# Patient Record
Sex: FEMALE | Race: OTHER | HISPANIC OR LATINO
[De-identification: names, ages, dates, MRNs, and addresses within clinical notes are randomized per-mention and may not be internally consistent; named-entity substitution may affect disease eponyms.]

---

## 2017-02-14 ENCOUNTER — TRANSCRIPTION ENCOUNTER (OUTPATIENT)
Age: 82
End: 2017-02-14

## 2017-02-14 ENCOUNTER — OUTPATIENT (OUTPATIENT)
Dept: OUTPATIENT SERVICES | Facility: HOSPITAL | Age: 82
LOS: 1 days | End: 2017-02-14
Payer: MEDICARE

## 2017-02-14 DIAGNOSIS — K59.00 CONSTIPATION, UNSPECIFIED: ICD-10-CM

## 2017-02-14 DIAGNOSIS — Z12.11 ENCOUNTER FOR SCREENING FOR MALIGNANT NEOPLASM OF COLON: ICD-10-CM

## 2017-02-14 PROCEDURE — T1013: CPT

## 2017-02-14 PROCEDURE — G0121: CPT

## 2018-12-14 ENCOUNTER — OUTPATIENT (OUTPATIENT)
Dept: OUTPATIENT SERVICES | Facility: HOSPITAL | Age: 83
LOS: 1 days | End: 2018-12-14
Payer: COMMERCIAL

## 2018-12-14 DIAGNOSIS — R27.9 UNSPECIFIED LACK OF COORDINATION: ICD-10-CM

## 2018-12-14 DIAGNOSIS — Z51.89 ENCOUNTER FOR OTHER SPECIFIED AFTERCARE: ICD-10-CM

## 2018-12-14 DIAGNOSIS — G20 PARKINSON'S DISEASE: ICD-10-CM

## 2019-01-31 PROCEDURE — 97112 NEUROMUSCULAR REEDUCATION: CPT

## 2019-01-31 PROCEDURE — G8979: CPT | Mod: CK

## 2019-01-31 PROCEDURE — 97163 PT EVAL HIGH COMPLEX 45 MIN: CPT

## 2019-01-31 PROCEDURE — G8978: CPT | Mod: CN

## 2019-01-31 PROCEDURE — 97530 THERAPEUTIC ACTIVITIES: CPT

## 2019-01-31 PROCEDURE — 97116 GAIT TRAINING THERAPY: CPT

## 2019-01-31 PROCEDURE — 97110 THERAPEUTIC EXERCISES: CPT

## 2020-07-26 ENCOUNTER — INPATIENT (INPATIENT)
Facility: HOSPITAL | Age: 85
LOS: 1 days | Discharge: HOME HEALTH | DRG: 308 | End: 2020-07-28
Attending: HOSPITALIST | Admitting: HOSPITALIST
Payer: COMMERCIAL

## 2020-07-26 VITALS
OXYGEN SATURATION: 98 % | RESPIRATION RATE: 20 BRPM | DIASTOLIC BLOOD PRESSURE: 76 MMHG | SYSTOLIC BLOOD PRESSURE: 127 MMHG | TEMPERATURE: 99 F | HEART RATE: 113 BPM

## 2020-07-26 DIAGNOSIS — I48.91 UNSPECIFIED ATRIAL FIBRILLATION: ICD-10-CM

## 2020-07-26 LAB
ALBUMIN SERPL ELPH-MCNC: 4.3 G/DL — SIGNIFICANT CHANGE UP (ref 3.3–5.2)
ALP SERPL-CCNC: 70 U/L — SIGNIFICANT CHANGE UP (ref 40–120)
ALT FLD-CCNC: 7 U/L — SIGNIFICANT CHANGE UP
ANION GAP SERPL CALC-SCNC: 14 MMOL/L — SIGNIFICANT CHANGE UP (ref 5–17)
APPEARANCE UR: CLEAR — SIGNIFICANT CHANGE UP
APPEARANCE UR: CLEAR — SIGNIFICANT CHANGE UP
APTT BLD: 28.6 SEC — SIGNIFICANT CHANGE UP (ref 27.5–35.5)
AST SERPL-CCNC: 28 U/L — SIGNIFICANT CHANGE UP
BACTERIA # UR AUTO: SIGNIFICANT CHANGE UP
BILIRUB SERPL-MCNC: 1.2 MG/DL — SIGNIFICANT CHANGE UP (ref 0.4–2)
BILIRUB UR-MCNC: NEGATIVE — SIGNIFICANT CHANGE UP
BILIRUB UR-MCNC: NEGATIVE — SIGNIFICANT CHANGE UP
BUN SERPL-MCNC: 19 MG/DL — SIGNIFICANT CHANGE UP (ref 8–20)
CALCIUM SERPL-MCNC: 10.2 MG/DL — SIGNIFICANT CHANGE UP (ref 8.6–10.2)
CHLORIDE SERPL-SCNC: 102 MMOL/L — SIGNIFICANT CHANGE UP (ref 98–107)
CO2 SERPL-SCNC: 23 MMOL/L — SIGNIFICANT CHANGE UP (ref 22–29)
COLOR SPEC: YELLOW — SIGNIFICANT CHANGE UP
COLOR SPEC: YELLOW — SIGNIFICANT CHANGE UP
CREAT SERPL-MCNC: 1.33 MG/DL — HIGH (ref 0.5–1.3)
DIFF PNL FLD: ABNORMAL
DIFF PNL FLD: NEGATIVE — SIGNIFICANT CHANGE UP
EPI CELLS # UR: SIGNIFICANT CHANGE UP
EPI CELLS # UR: SIGNIFICANT CHANGE UP
GLUCOSE SERPL-MCNC: 91 MG/DL — SIGNIFICANT CHANGE UP (ref 70–99)
GLUCOSE UR QL: NEGATIVE MG/DL — SIGNIFICANT CHANGE UP
GLUCOSE UR QL: NEGATIVE MG/DL — SIGNIFICANT CHANGE UP
HCT VFR BLD CALC: 43 % — SIGNIFICANT CHANGE UP (ref 34.5–45)
HGB BLD-MCNC: 14.1 G/DL — SIGNIFICANT CHANGE UP (ref 11.5–15.5)
INR BLD: 1.03 RATIO — SIGNIFICANT CHANGE UP (ref 0.88–1.16)
KETONES UR-MCNC: ABNORMAL
KETONES UR-MCNC: NEGATIVE — SIGNIFICANT CHANGE UP
LEUKOCYTE ESTERASE UR-ACNC: ABNORMAL
LEUKOCYTE ESTERASE UR-ACNC: ABNORMAL
MCHC RBC-ENTMCNC: 27.3 PG — SIGNIFICANT CHANGE UP (ref 27–34)
MCHC RBC-ENTMCNC: 32.8 GM/DL — SIGNIFICANT CHANGE UP (ref 32–36)
MCV RBC AUTO: 83.2 FL — SIGNIFICANT CHANGE UP (ref 80–100)
NITRITE UR-MCNC: NEGATIVE — SIGNIFICANT CHANGE UP
NITRITE UR-MCNC: NEGATIVE — SIGNIFICANT CHANGE UP
NT-PROBNP SERPL-SCNC: 2241 PG/ML — HIGH (ref 0–300)
PH UR: 6.5 — SIGNIFICANT CHANGE UP (ref 5–8)
PH UR: 6.5 — SIGNIFICANT CHANGE UP (ref 5–8)
PLATELET # BLD AUTO: 168 K/UL — SIGNIFICANT CHANGE UP (ref 150–400)
POTASSIUM SERPL-MCNC: 4.6 MMOL/L — SIGNIFICANT CHANGE UP (ref 3.5–5.3)
POTASSIUM SERPL-SCNC: 4.6 MMOL/L — SIGNIFICANT CHANGE UP (ref 3.5–5.3)
PROT SERPL-MCNC: 7.6 G/DL — SIGNIFICANT CHANGE UP (ref 6.6–8.7)
PROT UR-MCNC: 30 MG/DL
PROT UR-MCNC: NEGATIVE MG/DL — SIGNIFICANT CHANGE UP
PROTHROM AB SERPL-ACNC: 11.9 SEC — SIGNIFICANT CHANGE UP (ref 10.6–13.6)
RBC # BLD: 5.17 M/UL — SIGNIFICANT CHANGE UP (ref 3.8–5.2)
RBC # FLD: 20 % — HIGH (ref 10.3–14.5)
RBC CASTS # UR COMP ASSIST: NEGATIVE /HPF — SIGNIFICANT CHANGE UP (ref 0–4)
RBC CASTS # UR COMP ASSIST: SIGNIFICANT CHANGE UP /HPF (ref 0–4)
SODIUM SERPL-SCNC: 139 MMOL/L — SIGNIFICANT CHANGE UP (ref 135–145)
SP GR SPEC: 1 — LOW (ref 1.01–1.02)
SP GR SPEC: 1.01 — SIGNIFICANT CHANGE UP (ref 1.01–1.02)
TROPONIN T SERPL-MCNC: <0.01 NG/ML — SIGNIFICANT CHANGE UP (ref 0–0.06)
UROBILINOGEN FLD QL: NEGATIVE MG/DL — SIGNIFICANT CHANGE UP
UROBILINOGEN FLD QL: NEGATIVE MG/DL — SIGNIFICANT CHANGE UP
WBC # BLD: 7.55 K/UL — SIGNIFICANT CHANGE UP (ref 3.8–10.5)
WBC # FLD AUTO: 7.55 K/UL — SIGNIFICANT CHANGE UP (ref 3.8–10.5)
WBC UR QL: ABNORMAL
WBC UR QL: SIGNIFICANT CHANGE UP

## 2020-07-26 PROCEDURE — 70450 CT HEAD/BRAIN W/O DYE: CPT | Mod: 26

## 2020-07-26 PROCEDURE — 99223 1ST HOSP IP/OBS HIGH 75: CPT

## 2020-07-26 PROCEDURE — 93010 ELECTROCARDIOGRAM REPORT: CPT

## 2020-07-26 PROCEDURE — 71045 X-RAY EXAM CHEST 1 VIEW: CPT | Mod: 26

## 2020-07-26 PROCEDURE — 99285 EMERGENCY DEPT VISIT HI MDM: CPT

## 2020-07-26 RX ORDER — METOPROLOL TARTRATE 50 MG
5 TABLET ORAL ONCE
Refills: 0 | Status: COMPLETED | OUTPATIENT
Start: 2020-07-26 | End: 2020-07-26

## 2020-07-26 RX ORDER — ENOXAPARIN SODIUM 100 MG/ML
50 INJECTION SUBCUTANEOUS EVERY 12 HOURS
Refills: 0 | Status: DISCONTINUED | OUTPATIENT
Start: 2020-07-27 | End: 2020-07-28

## 2020-07-26 RX ORDER — ASPIRIN/CALCIUM CARB/MAGNESIUM 324 MG
81 TABLET ORAL DAILY
Refills: 0 | Status: DISCONTINUED | OUTPATIENT
Start: 2020-07-26 | End: 2020-07-28

## 2020-07-26 RX ORDER — ENOXAPARIN SODIUM 100 MG/ML
50 INJECTION SUBCUTANEOUS ONCE
Refills: 0 | Status: DISCONTINUED | OUTPATIENT
Start: 2020-07-26 | End: 2020-07-26

## 2020-07-26 RX ORDER — ATORVASTATIN CALCIUM 80 MG/1
10 TABLET, FILM COATED ORAL AT BEDTIME
Refills: 0 | Status: DISCONTINUED | OUTPATIENT
Start: 2020-07-26 | End: 2020-07-28

## 2020-07-26 RX ORDER — CARBIDOPA AND LEVODOPA 25; 100 MG/1; MG/1
1 TABLET ORAL THREE TIMES A DAY
Refills: 0 | Status: DISCONTINUED | OUTPATIENT
Start: 2020-07-26 | End: 2020-07-28

## 2020-07-26 RX ORDER — LABETALOL HCL 100 MG
10 TABLET ORAL ONCE
Refills: 0 | Status: COMPLETED | OUTPATIENT
Start: 2020-07-26 | End: 2020-07-26

## 2020-07-26 RX ORDER — ENOXAPARIN SODIUM 100 MG/ML
50 INJECTION SUBCUTANEOUS ONCE
Refills: 0 | Status: COMPLETED | OUTPATIENT
Start: 2020-07-26 | End: 2020-07-26

## 2020-07-26 RX ORDER — METOPROLOL TARTRATE 50 MG
25 TABLET ORAL
Refills: 0 | Status: DISCONTINUED | OUTPATIENT
Start: 2020-07-26 | End: 2020-07-28

## 2020-07-26 RX ORDER — ACETAMINOPHEN 500 MG
650 TABLET ORAL EVERY 4 HOURS
Refills: 0 | Status: DISCONTINUED | OUTPATIENT
Start: 2020-07-26 | End: 2020-07-28

## 2020-07-26 RX ORDER — DILTIAZEM HCL 120 MG
30 CAPSULE, EXT RELEASE 24 HR ORAL EVERY 8 HOURS
Refills: 0 | Status: DISCONTINUED | OUTPATIENT
Start: 2020-07-26 | End: 2020-07-27

## 2020-07-26 RX ORDER — LISINOPRIL 2.5 MG/1
20 TABLET ORAL DAILY
Refills: 0 | Status: DISCONTINUED | OUTPATIENT
Start: 2020-07-26 | End: 2020-07-26

## 2020-07-26 RX ADMIN — Medication 5 MILLIGRAM(S): at 20:37

## 2020-07-26 RX ADMIN — Medication 25 MILLIGRAM(S): at 19:26

## 2020-07-26 RX ADMIN — ENOXAPARIN SODIUM 50 MILLIGRAM(S): 100 INJECTION SUBCUTANEOUS at 19:24

## 2020-07-26 RX ADMIN — Medication 10 MILLIGRAM(S): at 21:45

## 2020-07-26 RX ADMIN — Medication 30 MILLIGRAM(S): at 22:13

## 2020-07-26 RX ADMIN — CARBIDOPA AND LEVODOPA 1 TABLET(S): 25; 100 TABLET ORAL at 22:15

## 2020-07-26 NOTE — ED PROVIDER NOTE - PROGRESS NOTE DETAILS
spoke with Dr Cervantes who agrees with patient admission and evaluation new onset atrial fibrillation

## 2020-07-26 NOTE — H&P ADULT - ASSESSMENT
84 y/o woman with HTN, Parkinson's disease who p/w AMS x 4 days, f/w new a fib with RVR in ED.    *AMS: per son, daughter-in-law, patient has been nervous and anxious, due to stress as patient's daughter wants to take her to live with her. Patient calm at time of interview  - CT head negative  - no signs of infection  - unclear if related to new a fib  - f/u physical exam  - consider MRI brain, neurology consult    *A fib with RVR:  - cardiology consulted - recommend AC, TTE, started on diltiazem  - AC with SC lovenox 1 mg/kg q12h  - continue home metoprolol succinate 25 mg q12h for now    *HTN: continue lisinopril 20 mg daily    *Parkinson's disease: continue Sinemet  mg TID  - PT consult    *Back pain: etiology unclear, f/u physical exam    Regular diet    Plan discussed with Dr. Kim 84 y/o woman with HTN, Parkinson's disease who p/w AMS x 4 days, no neurological deficits on exam, ct head negative, back to baseline in the ER, also noted with new onset a fib with RVR in ED.    Admit to telemetry     *AMS- transient now back to baseline, possible TIA vs stress induced  - per son, daughter-in-law, patient has been nervous and anxious, due to stress as patient's daughter wants to take her to live with her. -Patient calm at time of interview and AAOX3   - CT head negative  - no signs of infection UA and cxr negative/ no neurological or cognitive deficit on exam   - unclear if related to new a fib  - f/u tte and carotid us   - Per family more stress related  -f/u hba1c/ lipid profile   - c/w asa/ AC per cardio for a fib and atorvastatin   -f/u MRI will d/w family if pt has any     *A fib with RVR:  - cardiology consulted - recommend AC, TTE, started on diltiazem  - AC with SC lovenox 1 mg/kg q12h  - continue home metoprolol succinate 25 mg q12h for now    *MARIANA 2/2 drug induced   - pt is on lisinopril   - holding for 24 hrs and will reassess   - avoid nephrotoxic medications   - no baseline     *HTN:   - bp stable   -c/w diltiazem/ metoprolol   - holding lisinopril given needing room for rate control     *Parkinson's disease:   continue with Sinemet  mg TID  - PT consult    *Back pain: etiology unclear    Regular diet    Plan discussed with Dr. Kim 84 y/o woman with HTN, Parkinson's disease who p/w AMS x 4 days, no neurological deficits on exam, ct head negative, back to baseline in the ER, also noted with new onset a fib with RVR in ED.    Admit to telemetry     *AMS- transient now back to baseline, possible TIA vs stress induced vs related to underlying progression parkinsons dz  - per son, daughter-in-law, patient has been nervous and anxious, due to stress as patient's daughter wants to take her to live with her. -Patient calm at time of interview and AAOX3   - CT head negative  - no signs of infection UA and cxr negative/ no neurological or cognitive deficit on exam   - unclear if related to new a fib  - f/u tte and carotid us   - Per family more stress related  -f/u hba1c/ lipid profile   - c/w asa/ AC per cardio for a fib and atorvastatin   -tried calling family to see if pt could have mri performed, will f/u again and see if pt becomes less anxious and amenable as well as if no metal present     *A fib with RVR:  - cardiology consulted - recommend AC, TTE, started on diltiazem  - AC with SC lovenox 1 mg/kg q12h  - continue home metoprolol succinate 25 mg q12h for now    *MARIANA 2/2 drug induced   - slight up trend in cr to 1.3  - pt is on lisinopril   - holding for 24 hrs and will reassess   - avoid nephrotoxic medications   - no baseline  but if maintained at 1.3 and if needed for bp control can add back lisinopril     *HTN:   - bp stable   -c/w diltiazem/ metoprolol   - holding lisinopril given needing room for rate control meds for AF and MARIANA     *Parkinson's disease:   continue with Sinemet  mg TID  - PT consulted    *Back pain  c/w tylenol prn  -c/w lidocaine patch     Regular diet    DVT ppx  covered with full dose lovenox     Dispo: Pt lives at home with her kids. PT consulted and will await recommendations.     Plan discussed with Dr. Kim

## 2020-07-26 NOTE — ED ADULT TRIAGE NOTE - CHIEF COMPLAINT QUOTE
with  at bedside, pt awake and alert, unsure of month, sent to ED for AMS. per EMS, family states pt is not acting herself. pt offers no complaints at this time.

## 2020-07-26 NOTE — H&P ADULT - ATTENDING COMMENTS
Agree with tele hospitalist note above Agree with tele hospitalist note above  note edited where needed

## 2020-07-26 NOTE — ED ADULT NURSE REASSESSMENT NOTE - NS ED NURSE REASSESS COMMENT FT1
Medicine PA Mele Clark contacted at [phone], made aware of VS as per flowsheet. Medicated with 5 of lopressor as per orders. Pt remains on cardiac monitor and .
report given at change of shift and pt endorsed to oncoming albert delong for follow up and continuity of care.
pt medicated as per ordered.
received report from rn anabela wagoner, pt laying in stretcher, ams, a&ox2 with periods of confusion,  at bedside, pt states she is unable to urinate, c/o lower abdominal pain, pt hypertensive 223/140, hr-140, JOSEFA Shabazz made aware.

## 2020-07-26 NOTE — ED ADULT NURSE NOTE - NSIMPLEMENTINTERV_GEN_ALL_ED
Implemented All Fall Risk Interventions:  [place] to call system. Call bell, personal items and telephone within reach. Instruct patient to call for assistance. Room bathroom lighting operational. Non-slip footwear when patient is off stretcher. Physically safe environment: no spills, clutter or unnecessary equipment. Stretcher in lowest position, wheels locked, appropriate side rails in place. Provide visual cue, wrist band, yellow gown, etc. Monitor gait and stability. Monitor for mental status changes and reorient to person, place, and time. Review medications for side effects contributing to fall risk. Reinforce activity limits and safety measures with patient and family.

## 2020-07-26 NOTE — H&P ADULT - HISTORY OF PRESENT ILLNESS
HPI:    Patient is a 86 y/o woman with HTN, Parkinson's disease who p/w AMS x 4 days. Per daughter-in-law the patient has been very anxious and nervous. Also was found to have elevated HR by EMS at home. Patient was under stress because her daughter wanted to take her to live with her. Patient is a poor historian and reports that her children brought her in due to difficult standing up. Reports she's been taking her medications. Denies urinary symptoms. Denies SOB, CP. Denies F/C. Denies palpitations. Reports back pain - upper back. Reports back pain x 2 days. Denies falling or injury recently. In the ED patient found to a fib with RVR. Cardiology consulted.    PAST MEDICAL & SURGICAL HISTORY:      Review of Systems:   CONSTITUTIONAL: No fever, weight loss, or fatigue  EYES: No eye pain, visual disturbances, or discharge  NECK: No pain or stiffness  RESPIRATORY: No cough, wheezing, chills or hemoptysis; No shortness of breath  CARDIOVASCULAR: No chest pain, palpitations, dizziness, or leg swelling  GASTROINTESTINAL: No abdominal or epigastric pain. No nausea, vomiting, or hematemesis; No diarrhea or constipation. No melena or hematochezia.  GENITOURINARY: No dysuria, frequency, hematuria, or incontinence  NEUROLOGICAL: No headaches, memory loss, loss of strength, numbness, or tremors  SKIN: No itching, burning, rashes, or lesions   LYMPH NODES: No enlarged glands  MUSCULOSKELETAL: + back pain  HEME/LYMPH: No easy bruising, or bleeding gums    Allergies    No Known Allergies    Intolerances        Social History: lives with son and daughter-in-law     FAMILY HISTORY:      MEDICATIONS  (STANDING):  carbidopa/levodopa  25/100 1 Tablet(s) Oral three times a day  diltiazem    Tablet 30 milliGRAM(s) Oral every 8 hours  enoxaparin Injectable 50 milliGRAM(s) SubCutaneous Once  lisinopril 20 milliGRAM(s) Oral daily  metoprolol succinate ER 25 milliGRAM(s) Oral two times a day    MEDICATIONS  (PRN):  acetaminophen   Tablet .. 650 milliGRAM(s) Oral every 4 hours PRN Mild Pain (1 - 3)      T(C): 37 (20 @ 12:02), Max: 37 (20 @ 12:02)  HR: 113 (20 @ 12:02) (113 - 113)  BP: 127/76 (20 @ 12:02) (127/76 - 127/76)  RR: 20 (20 @ 12:02) (20 - 20)  SpO2: 98% (20 @ 12:02) (98% - 98%)    CAPILLARY BLOOD GLUCOSE      POCT Blood Glucose.: 118 mg/dL (2020 12:14)    I&O's Summary      PHYSICAL EXAM: telemedicine      LABS:                        14.1   7.55  )-----------( 168      ( 2020 15:31 )             43.0         139  |  102  |  19.0  ----------------------------<  91  4.6   |  23.0  |  1.33<H>    Ca    10.2      2020 15:31    TPro  7.6  /  Alb  4.3  /  TBili  1.2  /  DBili  x   /  AST  28  /  ALT  7   /  AlkPhos  70  26    PT/INR - ( 2020 15:31 )   PT: 11.9 sec;   INR: 1.03 ratio         PTT - ( 2020 15:31 )  PTT:28.6 sec  CARDIAC MARKERS ( 2020 15:31 )  x     / <0.01 ng/mL / x     / x     / x          Urinalysis Basic - ( 2020 14:12 )    Color: Yellow / Appearance: Clear / S.005 / pH: x  Gluc: x / Ketone: Negative  / Bili: Negative / Urobili: Negative mg/dL   Blood: x / Protein: Negative mg/dL / Nitrite: Negative   Leuk Esterase: Moderate / RBC: Negative /HPF / WBC 6-10   Sq Epi: x / Non Sq Epi: Occasional / Bacteria: Rare        RADIOLOGY & ADDITIONAL TESTS:    CXR: clear lungs    < from: CT Head No Cont (20 @ 12:39) >    IMPRESSION:   Unremarkable head CT.    < end of copied text > HPI:    Patient is a 84 y/o woman with HTN, Parkinson's disease who p/w AMS x 4 days. Per daughter-in-law the patient has been very anxious and nervous. Also was found to have elevated HR by EMS at home. Patient was under stress because her daughter wanted to take her to live with her. Patient is a poor historian and reports that her children brought her in due to difficult standing up. Reports she's been taking her medications. Denies urinary symptoms. Denies SOB, CP. Denies F/C. Denies palpitations. Reports back pain - upper back. Reports back pain x 2 days. Denies falling or injury recently. In the ED patient found to a fib with RVR. Cardiology consulted.    PAST MEDICAL & SURGICAL HISTORY:      Review of Systems:   CONSTITUTIONAL: No fever, weight loss, or fatigue  EYES: No eye pain, visual disturbances, or discharge  NECK: No pain or stiffness  RESPIRATORY: No cough, wheezing, chills or hemoptysis; No shortness of breath  CARDIOVASCULAR: No chest pain, palpitations, dizziness, or leg swelling  GASTROINTESTINAL: No abdominal or epigastric pain. No nausea, vomiting, or hematemesis; No diarrhea or constipation. No melena or hematochezia.  GENITOURINARY: No dysuria, frequency, hematuria, or incontinence  NEUROLOGICAL: No headaches, memory loss, loss of strength, numbness, or tremors  SKIN: No itching, burning, rashes, or lesions   LYMPH NODES: No enlarged glands  MUSCULOSKELETAL: + back pain  HEME/LYMPH: No easy bruising, or bleeding gums    Allergies    No Known Allergies    Intolerances        Social History: lives with son and daughter-in-law     FAMILY HISTORY: none Denies cva/cad      MEDICATIONS  (STANDING):  carbidopa/levodopa  25/100 1 Tablet(s) Oral three times a day  diltiazem    Tablet 30 milliGRAM(s) Oral every 8 hours  enoxaparin Injectable 50 milliGRAM(s) SubCutaneous Once  lisinopril 20 milliGRAM(s) Oral daily  metoprolol succinate ER 25 milliGRAM(s) Oral two times a day    MEDICATIONS  (PRN):  acetaminophen   Tablet .. 650 milliGRAM(s) Oral every 4 hours PRN Mild Pain (1 - 3)      T(C): 37 (20 @ 12:02), Max: 37 (20 @ 12:02)  HR: 113 (20 @ 12:02) (113 - 113)  BP: 127/76 (20 @ 12:02) (127/76 - 127/76)  RR: 20 (20 @ 12:02) (20 - 20)  SpO2: 98% (20 @ 12:02) (98% - 98%)    CAPILLARY BLOOD GLUCOSE      POCT Blood Glucose.: 118 mg/dL (2020 12:14)    I&O's Summary      PHYSICAL EXAM: telemedicine      LABS:                        14.1   7.55  )-----------( 168      ( 2020 15:31 )             43.0         139  |  102  |  19.0  ----------------------------<  91  4.6   |  23.0  |  1.33<H>    Ca    10.2      2020 15:31    TPro  7.6  /  Alb  4.3  /  TBili  1.2  /  DBili  x   /  AST  28  /  ALT  7   /  AlkPhos  70  -26    PT/INR - ( 2020 15:31 )   PT: 11.9 sec;   INR: 1.03 ratio         PTT - ( 2020 15:31 )  PTT:28.6 sec  CARDIAC MARKERS ( 2020 15:31 )  x     / <0.01 ng/mL / x     / x     / x          Urinalysis Basic - ( 2020 14:12 )    Color: Yellow / Appearance: Clear / S.005 / pH: x  Gluc: x / Ketone: Negative  / Bili: Negative / Urobili: Negative mg/dL   Blood: x / Protein: Negative mg/dL / Nitrite: Negative   Leuk Esterase: Moderate / RBC: Negative /HPF / WBC 6-10   Sq Epi: x / Non Sq Epi: Occasional / Bacteria: Rare        RADIOLOGY & ADDITIONAL TESTS:    CXR: clear lungs    < from: CT Head No Cont (20 @ 12:39) >    IMPRESSION:   Unremarkable head CT.    < end of copied text >

## 2020-07-26 NOTE — CONSULT NOTE ADULT - SUBJECTIVE AND OBJECTIVE BOX
[facility], [facility]                                                    [address], [address], [place] [address]                                                         PHONE: [phone]    FAX: [phone] [address], [address], [place] [address]                                                 PHONE: [phone]    FAX: [phone]  *******************************************************************************  cc: AF    HPI: 85F with hx of SVT and parkinsonism who presents with agitation and anxiety. No CP or SOB. No PND or orthopnea. No dizziness or syncope. Hx of HTN and HL. Allergy to ASA. Pt with confusion. Poor historian      PAST MEDICAL & SURGICAL HISTORY:  HTN  HL  Parkinsons  SVT    Allergy:  ASA    MEDICATIONS  (STANDING):  enoxaparin Injectable 50 milliGRAM(s) SubCutaneous Once    MEDICATIONS  (PRN):      Vital Signs Last 24 Hrs  T(C): 37 (26 Jul 2020 12:02), Max: 37 (26 Jul 2020 12:02)  T(F): 98.6 (26 Jul 2020 12:02), Max: 98.6 (26 Jul 2020 12:02)  HR: 113 (26 Jul 2020 12:02) (113 - 113)  BP: 127/76 (26 Jul 2020 12:02) (127/76 - 127/76)  BP(mean): --  RR: 20 (26 Jul 2020 12:02) (20 - 20)  SpO2: 98% (26 Jul 2020 12:02) (98% - 98%)    I&O's Detail    I&O's Summary          PHYSICAL EXAM:  General: Appears well developed, well nourished, no acute distress. not in acute pain  HEAD: normal cephalic. Atraumatic  PUPILS: equal and reactive to light  EARS: normal hearing  NECK: supple. no JVD or HJR. no carotid bruits. no visible lymphadenopathy  NOSE: no gross abnormalities  CHEST: symmetric chest wall expansion  CARDIOVASCULAR: Normal rate. irregular irregular rhythm. Normal S1 and S2, no S3/S4,  no murmur, rub, or gallop  LUNGS: Normal effort. Normal respiratory rate. Breath sounds are clear to auscultation bilaterally. No respiratory distress. No stridor.  no rales, rhonchi or wheeze. no decreased Breath sounds  ABDOMEN: Soft, nontender, non-distended, positive bowel sounds, no mass or bruit. no abdominal tenderness. No rebound. no ascites  EXTREMITIES: No clubbing, cyanosis or edema. normal range of motion  PULSES:  distal pulses WNL  SKIN: Warm and dry with normal turgor. no visible rash or cyanosis   NEURO: Alert & oriented x 2 grossly intact with no focal weakness  PSYCH: normal mood and affect. Grossly normal insight and judgement exhibited    FAMILY HISTORY:  Unable to ascertain    SOCIAL HISTORY:  no active smoking. No ETOH/No IVDA    REVIEW OF SYSTEMS:  Constitutional: no fever, chills or malaise. No weight loss  Head: no trauma  Eyes: no visual deficit. No double vision  Ears: no hearing deficit or ringing in the ears  Nose: no nose bleeds or smell changes or congestion  Throat: no difficult swallowing or painful swallowing  Neck: supple. No lymphadenopathy or swelling  Respiratory: no SOB, wheeze, asthma, COPD. No cough. No blood in the sputum  Cardiovascular: no CP, palpitations, irregular heart beats. No edema. No PND. No orthopnea. No skin/temperature or color changes  Gastrointestinal: no abdominal pain. No constipation. No diarrhea. No melena. No nausea. No vomiting. No bloating  Genitourinary: no frequency or urgency. No hematuria  Lymphatics: no grossly swollen lymph nodes  Musculoskeletal: no limitation of range of motion. Normal strength. No pain  Integumentary: no visible rash. No itching  Neurologic: no HA. past stroke. +confusion. No tingling or numbness. No weakness. No dizziness  Psychiatric: denied. Reports appropriate mood.        LABS:                serum  Lipids:         RADIOLOGY & ADDITIONAL STUDIES:    ECG: . PRWP V1-3    < from: CT Head No Cont (07.26.20 @ 12:39) >  IMPRESSION:   Unremarkable head CT.    < end of copied text >      ASSESSMENT AND PLAN:  In summary, TD MUNOZ is a 85y Female with past medical history significant for hx of SVT and parkinsonism who presents with agitation and anxiety. No CP or SOB. No PND or orthopnea. No dizziness or syncope. Hx of HTN and HL. Allergy to ASA. Pt with confusion. Poor historian    - AF. Hx of SVT. ?fall risk. head ct unremarkable. Would place on lovenox if no acute neurologic contraindication. Cardizem for rate control    - Confusion. r/o TIA. needs neuro eval    - Laboratory values not available.    - Echo to assess LV function and valve function    - BP is stable. Maintains enalapril as an outpt    - Telemetry monitoring personally reviewed by me. AF mild increased VR    - ECG personally reviewed by me    - Echocardiogram imaging ordered    - radiologic imaging reviewed    - Laboratory data not resulted as of yet    - I spoke with Dr Cortez in the ER    Thany you for allowing me to participate in the care of your pt    Ayana Cervantes MD [facility], [facility]                                                    [address], [address], [place] [address]                                                         PHONE: [phone]    FAX: [phone] [address], [address], [place] [address]                                                 PHONE: [phone]    FAX: [phone]  *******************************************************************************  cc: AF    HPI: 85F with hx of SVT and parkinsonism who presents with agitation and anxiety. No CP or SOB. No PND or orthopnea. No dizziness or syncope. Hx of HTN and HL. Allergy to ASA. Pt with confusion. Poor historian      PAST MEDICAL & SURGICAL HISTORY:  HTN  HL  Parkinsons  SVT    Allergy:  ASA    MEDICATIONS  (STANDING):  enoxaparin Injectable 50 milliGRAM(s) SubCutaneous Once    MEDICATIONS  (PRN):      Vital Signs Last 24 Hrs  T(C): 37 (26 Jul 2020 12:02), Max: 37 (26 Jul 2020 12:02)  T(F): 98.6 (26 Jul 2020 12:02), Max: 98.6 (26 Jul 2020 12:02)  HR: 113 (26 Jul 2020 12:02) (113 - 113)  BP: 127/76 (26 Jul 2020 12:02) (127/76 - 127/76)  BP(mean): --  RR: 20 (26 Jul 2020 12:02) (20 - 20)  SpO2: 98% (26 Jul 2020 12:02) (98% - 98%)    I&O's Detail    I&O's Summary          PHYSICAL EXAM:  General: Appears well developed, well nourished, no acute distress. not in acute pain  HEAD: normal cephalic. Atraumatic  PUPILS: equal and reactive to light  EARS: normal hearing  NECK: supple. no JVD or HJR. no carotid bruits. no visible lymphadenopathy  NOSE: no gross abnormalities  CHEST: symmetric chest wall expansion  CARDIOVASCULAR: Normal rate. irregular irregular rhythm. Normal S1 and S2, no S3/S4,  no murmur, rub, or gallop  LUNGS: Normal effort. Normal respiratory rate. Breath sounds are clear to auscultation bilaterally. No respiratory distress. No stridor.  no rales, rhonchi or wheeze. no decreased Breath sounds  ABDOMEN: Soft, nontender, non-distended, positive bowel sounds, no mass or bruit. no abdominal tenderness. No rebound. no ascites  EXTREMITIES: No clubbing, cyanosis or edema. normal range of motion  PULSES:  distal pulses WNL  SKIN: Warm and dry with normal turgor. no visible rash or cyanosis   NEURO: Alert & oriented x 2 grossly intact with no focal weakness  PSYCH: normal mood and affect. Grossly normal insight and judgement exhibited    FAMILY HISTORY:  Unable to ascertain    SOCIAL HISTORY:  no active smoking. No ETOH/No IVDA    REVIEW OF SYSTEMS:  Constitutional: no fever, chills or malaise. No weight loss  Head: no trauma  Eyes: no visual deficit. No double vision  Ears: no hearing deficit or ringing in the ears  Nose: no nose bleeds or smell changes or congestion  Throat: no difficult swallowing or painful swallowing  Neck: supple. No lymphadenopathy or swelling  Respiratory: no SOB, wheeze, asthma, COPD. No cough. No blood in the sputum  Cardiovascular: no CP, palpitations, irregular heart beats. No edema. No PND. No orthopnea. No skin/temperature or color changes  Gastrointestinal: no abdominal pain. No constipation. No diarrhea. No melena. No nausea. No vomiting. No bloating  Genitourinary: no frequency or urgency. No hematuria  Lymphatics: no grossly swollen lymph nodes  Musculoskeletal: no limitation of range of motion. Normal strength. No pain  Integumentary: no visible rash. No itching  Neurologic: no HA. past stroke. +confusion. No tingling or numbness. No weakness. No dizziness  Psychiatric: denied. Reports appropriate mood.        LABS:                serum  Lipids:         RADIOLOGY & ADDITIONAL STUDIES:    ECG: . PRWP V1-3    < from: CT Head No Cont (07.26.20 @ 12:39) >  IMPRESSION:   Unremarkable head CT.    < end of copied text >      ASSESSMENT AND PLAN:  In summary, TD MUNOZ is a 85y Female with past medical history significant for hx of SVT and parkinsonism who presents with agitation and anxiety. No CP or SOB. No PND or orthopnea. No dizziness or syncope. Hx of HTN and HL. Allergy to ASA. Pt with confusion. Poor historian    - AF. Hx of SVT. ?fall risk. head ct unremarkable. Would place on lovenox if no acute neurologic contraindication. Unknown Hgb. Did get one dose lovenox in the ER. Cardizem for rate control    - Confusion. r/o TIA. needs neuro eval    - Laboratory values not available.    - Echo to assess LV function and valve function    - BP is stable. Maintains enalapril as an outpt    - Telemetry monitoring personally reviewed by me. AF mild increased VR    - ECG personally reviewed by me    - Echocardiogram imaging ordered    - radiologic imaging reviewed    - Laboratory data not resulted as of yet    - I spoke with Dr Cortez in the ER    Thany you for allowing me to participate in the care of your pt    Ayana Cervantes MD

## 2020-07-26 NOTE — ED PROVIDER NOTE - OBJECTIVE STATEMENT
84 yo female pmh parkinsons disease , htn as per family with with ams ; feeling  anxious and aggitated for 4 days; pt with  history of  left eye injury when she was 8 ; pt denies any chest pain, sob, nausea or vomiting;  pt sees [facility], Yosi Waller for her Parkinsons and Dr Cervantes of cardiology;

## 2020-07-26 NOTE — ED PROVIDER NOTE - PHYSICAL EXAMINATION
Alert, lucid, and in no apparent distress. Pt is normocephalic, atraumatic.  Pupils  rt eye abnormal extraocular movement. External ear without bleeding or mass, no nasal discharge or malodor, lips pink, moist mucous membranes, tongue midline. Neck supple.   Lungs clear Heart regular rate and rhythm, normal S1, S2, no murmurs, gallops, rubs.  Abdomen is soft, nontender, no pulsatile mass, no masses, no distension, no rebound. No CVA Tenderness, no suprapubic tenderness.   Non-focal sensory, 5 out of 5 motor strength, no dysmetria, fluent, goal directed speech. CN2 to 12 intact. Skin without rash,

## 2020-07-27 LAB
A1C WITH ESTIMATED AVERAGE GLUCOSE RESULT: 5.6 % — SIGNIFICANT CHANGE UP (ref 4–5.6)
ANION GAP SERPL CALC-SCNC: 15 MMOL/L — SIGNIFICANT CHANGE UP (ref 5–17)
BASOPHILS # BLD AUTO: 0.01 K/UL — SIGNIFICANT CHANGE UP (ref 0–0.2)
BASOPHILS NFR BLD AUTO: 0.1 % — SIGNIFICANT CHANGE UP (ref 0–2)
BUN SERPL-MCNC: 19 MG/DL — SIGNIFICANT CHANGE UP (ref 8–20)
CALCIUM SERPL-MCNC: 9.8 MG/DL — SIGNIFICANT CHANGE UP (ref 8.6–10.2)
CHLORIDE SERPL-SCNC: 104 MMOL/L — SIGNIFICANT CHANGE UP (ref 98–107)
CHOLEST SERPL-MCNC: 164 MG/DL — SIGNIFICANT CHANGE UP (ref 110–199)
CK SERPL-CCNC: 48 U/L — SIGNIFICANT CHANGE UP (ref 25–170)
CO2 SERPL-SCNC: 23 MMOL/L — SIGNIFICANT CHANGE UP (ref 22–29)
CREAT SERPL-MCNC: 1.24 MG/DL — SIGNIFICANT CHANGE UP (ref 0.5–1.3)
EOSINOPHIL # BLD AUTO: 0.06 K/UL — SIGNIFICANT CHANGE UP (ref 0–0.5)
EOSINOPHIL NFR BLD AUTO: 0.7 % — SIGNIFICANT CHANGE UP (ref 0–6)
ESTIMATED AVERAGE GLUCOSE: 114 MG/DL — SIGNIFICANT CHANGE UP (ref 68–114)
GLUCOSE SERPL-MCNC: 93 MG/DL — SIGNIFICANT CHANGE UP (ref 70–99)
HCT VFR BLD CALC: 44.1 % — SIGNIFICANT CHANGE UP (ref 34.5–45)
HDLC SERPL-MCNC: 75 MG/DL — SIGNIFICANT CHANGE UP
HGB BLD-MCNC: 14.2 G/DL — SIGNIFICANT CHANGE UP (ref 11.5–15.5)
IMM GRANULOCYTES NFR BLD AUTO: 0.2 % — SIGNIFICANT CHANGE UP (ref 0–1.5)
LIPID PNL WITH DIRECT LDL SERPL: 76 MG/DL — SIGNIFICANT CHANGE UP
LYMPHOCYTES # BLD AUTO: 2.02 K/UL — SIGNIFICANT CHANGE UP (ref 1–3.3)
LYMPHOCYTES # BLD AUTO: 24.3 % — SIGNIFICANT CHANGE UP (ref 13–44)
MAGNESIUM SERPL-MCNC: 1.9 MG/DL — SIGNIFICANT CHANGE UP (ref 1.6–2.6)
MCHC RBC-ENTMCNC: 27.3 PG — SIGNIFICANT CHANGE UP (ref 27–34)
MCHC RBC-ENTMCNC: 32.2 GM/DL — SIGNIFICANT CHANGE UP (ref 32–36)
MCV RBC AUTO: 84.6 FL — SIGNIFICANT CHANGE UP (ref 80–100)
MONOCYTES # BLD AUTO: 0.84 K/UL — SIGNIFICANT CHANGE UP (ref 0–0.9)
MONOCYTES NFR BLD AUTO: 10.1 % — SIGNIFICANT CHANGE UP (ref 2–14)
NEUTROPHILS # BLD AUTO: 5.37 K/UL — SIGNIFICANT CHANGE UP (ref 1.8–7.4)
NEUTROPHILS NFR BLD AUTO: 64.6 % — SIGNIFICANT CHANGE UP (ref 43–77)
PLATELET # BLD AUTO: 128 K/UL — LOW (ref 150–400)
POTASSIUM SERPL-MCNC: 4.8 MMOL/L — SIGNIFICANT CHANGE UP (ref 3.5–5.3)
POTASSIUM SERPL-SCNC: 4.8 MMOL/L — SIGNIFICANT CHANGE UP (ref 3.5–5.3)
RBC # BLD: 5.21 M/UL — HIGH (ref 3.8–5.2)
RBC # FLD: 19.9 % — HIGH (ref 10.3–14.5)
SARS-COV-2 IGG SERPL QL IA: NEGATIVE — SIGNIFICANT CHANGE UP
SARS-COV-2 IGM SERPL IA-ACNC: <3.8 AU/ML — SIGNIFICANT CHANGE UP
SARS-COV-2 RNA SPEC QL NAA+PROBE: SIGNIFICANT CHANGE UP
SODIUM SERPL-SCNC: 142 MMOL/L — SIGNIFICANT CHANGE UP (ref 135–145)
TOTAL CHOLESTEROL/HDL RATIO MEASUREMENT: 2 RATIO — LOW (ref 3.3–7.1)
TRIGL SERPL-MCNC: 63 MG/DL — SIGNIFICANT CHANGE UP (ref 10–200)
TROPONIN T SERPL-MCNC: <0.01 NG/ML — SIGNIFICANT CHANGE UP (ref 0–0.06)
TSH SERPL-MCNC: 1.75 UIU/ML — SIGNIFICANT CHANGE UP (ref 0.27–4.2)
WBC # BLD: 8.32 K/UL — SIGNIFICANT CHANGE UP (ref 3.8–10.5)
WBC # FLD AUTO: 8.32 K/UL — SIGNIFICANT CHANGE UP (ref 3.8–10.5)

## 2020-07-27 PROCEDURE — 93880 EXTRACRANIAL BILAT STUDY: CPT | Mod: 26

## 2020-07-27 PROCEDURE — 99232 SBSQ HOSP IP/OBS MODERATE 35: CPT

## 2020-07-27 RX ORDER — DILTIAZEM HCL 120 MG
60 CAPSULE, EXT RELEASE 24 HR ORAL EVERY 8 HOURS
Refills: 0 | Status: DISCONTINUED | OUTPATIENT
Start: 2020-07-27 | End: 2020-07-28

## 2020-07-27 RX ORDER — LABETALOL HCL 100 MG
10 TABLET ORAL ONCE
Refills: 0 | Status: COMPLETED | OUTPATIENT
Start: 2020-07-27 | End: 2020-07-27

## 2020-07-27 RX ADMIN — Medication 10 MILLIGRAM(S): at 22:22

## 2020-07-27 RX ADMIN — ENOXAPARIN SODIUM 50 MILLIGRAM(S): 100 INJECTION SUBCUTANEOUS at 19:10

## 2020-07-27 RX ADMIN — Medication 25 MILLIGRAM(S): at 19:10

## 2020-07-27 RX ADMIN — Medication 650 MILLIGRAM(S): at 06:50

## 2020-07-27 RX ADMIN — CARBIDOPA AND LEVODOPA 1 TABLET(S): 25; 100 TABLET ORAL at 21:43

## 2020-07-27 RX ADMIN — ENOXAPARIN SODIUM 50 MILLIGRAM(S): 100 INJECTION SUBCUTANEOUS at 05:25

## 2020-07-27 RX ADMIN — CARBIDOPA AND LEVODOPA 1 TABLET(S): 25; 100 TABLET ORAL at 12:09

## 2020-07-27 RX ADMIN — Medication 30 MILLIGRAM(S): at 05:25

## 2020-07-27 RX ADMIN — Medication 650 MILLIGRAM(S): at 22:29

## 2020-07-27 RX ADMIN — CARBIDOPA AND LEVODOPA 1 TABLET(S): 25; 100 TABLET ORAL at 05:25

## 2020-07-27 RX ADMIN — Medication 650 MILLIGRAM(S): at 05:26

## 2020-07-27 RX ADMIN — Medication 60 MILLIGRAM(S): at 12:08

## 2020-07-27 RX ADMIN — Medication 25 MILLIGRAM(S): at 05:25

## 2020-07-27 RX ADMIN — Medication 81 MILLIGRAM(S): at 12:09

## 2020-07-27 RX ADMIN — ATORVASTATIN CALCIUM 10 MILLIGRAM(S): 80 TABLET, FILM COATED ORAL at 21:32

## 2020-07-27 RX ADMIN — Medication 650 MILLIGRAM(S): at 12:09

## 2020-07-27 RX ADMIN — Medication 60 MILLIGRAM(S): at 21:32

## 2020-07-27 NOTE — PROGRESS NOTE ADULT - ASSESSMENT
86 y/o woman with HTN, Parkinson's disease who p/w AMS x 4 days, no neurological deficits on exam, ct head negative, back to baseline in the ER, also noted with new onset a fib with RVR in ED.    *acute metabolic encephalopathy-  possible tia  - CT head negative  - no signs of infection UA and cxr negative/ no neurological or cognitive deficit on exam   -hba1c and lipid panel well controlled  - mri head would not , doac and statin on dc    *A fib with RVR:  - DOAC on dc, c.w metoprolol and cardizem     *MARIANA 2/2 drug induced   -improved    *HTN:   - bp stable   -c/w diltiazem/ metoprolol     *Parkinson's disease:   continue with Sinemet  mg TID    dc once cleared by pt and post tte

## 2020-07-28 ENCOUNTER — TRANSCRIPTION ENCOUNTER (OUTPATIENT)
Age: 85
End: 2020-07-28

## 2020-07-28 VITALS
TEMPERATURE: 98 F | OXYGEN SATURATION: 99 % | DIASTOLIC BLOOD PRESSURE: 71 MMHG | HEART RATE: 71 BPM | RESPIRATION RATE: 18 BRPM | SYSTOLIC BLOOD PRESSURE: 127 MMHG

## 2020-07-28 DIAGNOSIS — I10 ESSENTIAL (PRIMARY) HYPERTENSION: ICD-10-CM

## 2020-07-28 PROCEDURE — T1013: CPT

## 2020-07-28 PROCEDURE — 93880 EXTRACRANIAL BILAT STUDY: CPT

## 2020-07-28 PROCEDURE — 99285 EMERGENCY DEPT VISIT HI MDM: CPT | Mod: 25

## 2020-07-28 PROCEDURE — 93005 ELECTROCARDIOGRAM TRACING: CPT

## 2020-07-28 PROCEDURE — 81001 URINALYSIS AUTO W/SCOPE: CPT

## 2020-07-28 PROCEDURE — 70450 CT HEAD/BRAIN W/O DYE: CPT

## 2020-07-28 PROCEDURE — 82962 GLUCOSE BLOOD TEST: CPT

## 2020-07-28 PROCEDURE — 85610 PROTHROMBIN TIME: CPT

## 2020-07-28 PROCEDURE — 85027 COMPLETE CBC AUTOMATED: CPT

## 2020-07-28 PROCEDURE — 83735 ASSAY OF MAGNESIUM: CPT

## 2020-07-28 PROCEDURE — 97163 PT EVAL HIGH COMPLEX 45 MIN: CPT

## 2020-07-28 PROCEDURE — 84443 ASSAY THYROID STIM HORMONE: CPT

## 2020-07-28 PROCEDURE — 83036 HEMOGLOBIN GLYCOSYLATED A1C: CPT

## 2020-07-28 PROCEDURE — 82550 ASSAY OF CK (CPK): CPT

## 2020-07-28 PROCEDURE — 85730 THROMBOPLASTIN TIME PARTIAL: CPT

## 2020-07-28 PROCEDURE — 80053 COMPREHEN METABOLIC PANEL: CPT

## 2020-07-28 PROCEDURE — 80048 BASIC METABOLIC PNL TOTAL CA: CPT

## 2020-07-28 PROCEDURE — 99239 HOSP IP/OBS DSCHRG MGMT >30: CPT

## 2020-07-28 PROCEDURE — 80061 LIPID PANEL: CPT

## 2020-07-28 PROCEDURE — 36415 COLL VENOUS BLD VENIPUNCTURE: CPT

## 2020-07-28 PROCEDURE — 86769 SARS-COV-2 COVID-19 ANTIBODY: CPT

## 2020-07-28 PROCEDURE — C8929: CPT

## 2020-07-28 PROCEDURE — 83880 ASSAY OF NATRIURETIC PEPTIDE: CPT

## 2020-07-28 PROCEDURE — 84484 ASSAY OF TROPONIN QUANT: CPT

## 2020-07-28 PROCEDURE — U0003: CPT

## 2020-07-28 PROCEDURE — 71045 X-RAY EXAM CHEST 1 VIEW: CPT

## 2020-07-28 RX ORDER — METOPROLOL TARTRATE 50 MG
1 TABLET ORAL
Qty: 0 | Refills: 0 | DISCHARGE
Start: 2020-07-28

## 2020-07-28 RX ORDER — CARBIDOPA AND LEVODOPA 25; 100 MG/1; MG/1
1 TABLET ORAL
Qty: 0 | Refills: 0 | DISCHARGE

## 2020-07-28 RX ORDER — ATORVASTATIN CALCIUM 80 MG/1
2 TABLET, FILM COATED ORAL
Qty: 60 | Refills: 0
Start: 2020-07-28 | End: 2020-08-26

## 2020-07-28 RX ORDER — CARBIDOPA AND LEVODOPA 25; 100 MG/1; MG/1
1 TABLET ORAL
Qty: 0 | Refills: 0 | DISCHARGE
Start: 2020-07-28

## 2020-07-28 RX ORDER — METOPROLOL TARTRATE 50 MG
1 TABLET ORAL
Qty: 0 | Refills: 0 | DISCHARGE

## 2020-07-28 RX ORDER — DILTIAZEM HCL 120 MG
1 CAPSULE, EXT RELEASE 24 HR ORAL
Qty: 0 | Refills: 0 | DISCHARGE
Start: 2020-07-28

## 2020-07-28 RX ORDER — ASPIRIN/CALCIUM CARB/MAGNESIUM 324 MG
1 TABLET ORAL
Qty: 0 | Refills: 0 | DISCHARGE
Start: 2020-07-28

## 2020-07-28 RX ORDER — APIXABAN 2.5 MG/1
1 TABLET, FILM COATED ORAL
Qty: 60 | Refills: 0
Start: 2020-07-28 | End: 2020-08-26

## 2020-07-28 RX ORDER — DILTIAZEM HCL 120 MG
1 CAPSULE, EXT RELEASE 24 HR ORAL
Qty: 30 | Refills: 0
Start: 2020-07-28 | End: 2020-08-26

## 2020-07-28 RX ORDER — LISINOPRIL 2.5 MG/1
1 TABLET ORAL
Qty: 0 | Refills: 0 | DISCHARGE

## 2020-07-28 RX ORDER — ASPIRIN/CALCIUM CARB/MAGNESIUM 324 MG
1 TABLET ORAL
Qty: 30 | Refills: 0
Start: 2020-07-28 | End: 2020-08-26

## 2020-07-28 RX ADMIN — CARBIDOPA AND LEVODOPA 1 TABLET(S): 25; 100 TABLET ORAL at 13:35

## 2020-07-28 RX ADMIN — Medication 81 MILLIGRAM(S): at 11:22

## 2020-07-28 RX ADMIN — CARBIDOPA AND LEVODOPA 1 TABLET(S): 25; 100 TABLET ORAL at 05:26

## 2020-07-28 RX ADMIN — Medication 60 MILLIGRAM(S): at 13:35

## 2020-07-28 RX ADMIN — Medication 60 MILLIGRAM(S): at 05:26

## 2020-07-28 RX ADMIN — Medication 25 MILLIGRAM(S): at 05:26

## 2020-07-28 RX ADMIN — ENOXAPARIN SODIUM 50 MILLIGRAM(S): 100 INJECTION SUBCUTANEOUS at 05:26

## 2020-07-28 NOTE — DISCHARGE NOTE PROVIDER - CARE PROVIDER_API CALL
Ayana Cervantes  CARDIOVASCULAR DISEASE  [address] [place] [address]  Phone: [phone]  Fax: [phone]  Follow Up Time:

## 2020-07-28 NOTE — PROGRESS NOTE ADULT - SUBJECTIVE AND OBJECTIVE BOX
[facility], [facility]                                          [address] , [address], [place] [address]                                               PHONE: [phone]    FAX: [phone] [address], [address], [place] [address]                                       PHONE: [phone]    FAX: [phone]  *******************************************************************************    Overnight events/Subjective Assessment:    INTERPRETATION OF TELEMETRY (personally reviewed):    No Known Allergies    MEDICATIONS  (STANDING):  aspirin  chewable 81 milliGRAM(s) Oral daily  atorvastatin 10 milliGRAM(s) Oral at bedtime  carbidopa/levodopa  25/100 1 Tablet(s) Oral three times a day  diltiazem    Tablet 30 milliGRAM(s) Oral every 8 hours  enoxaparin Injectable 50 milliGRAM(s) SubCutaneous every 12 hours  metoprolol succinate ER 25 milliGRAM(s) Oral two times a day    MEDICATIONS  (PRN):  acetaminophen   Tablet .. 650 milliGRAM(s) Oral every 4 hours PRN Mild Pain (1 - 3)      Vital Signs Last 24 Hrs  T(C): 36.3 (27 Jul 2020 07:24), Max: 37 (26 Jul 2020 12:02)  T(F): 97.4 (27 Jul 2020 07:24), Max: 98.6 (26 Jul 2020 12:02)  HR: 71 (27 Jul 2020 07:24) (71 - 143)  BP: 138/84 (27 Jul 2020 07:24) (124/72 - 236/124)  BP(mean): --  RR: 18 (27 Jul 2020 07:24) (16 - 20)  SpO2: 100% (27 Jul 2020 07:24) (95% - 100%)    I&O's Detail    I&O's Summary          PHYSICAL EXAM:  General: Appears well developed, well nourished, no acute distress  HEENT: Head: normocephalic, atraumatic  Eyes: Pupils equal and reactive  Neck: Supple, no carotid bruit, no JVD, no HJR  CARDIOVASCULAR: Irreg irreg, Normal S1 and S2, no murmur, rub, or gallop  LUNGS: Clear to auscultation bilaterally, no rales, rhonchi or wheeze  ABDOMEN: Soft, nontender, non-distended, positive bowel sounds, no mass or bruit  EXTREMITIES: No edema, distal pulses WNL  SKIN: Warm and dry with normal turgor  NEURO: Alert & oriented, grossly intact, + tremor  PSYCH: normal mood and affect        LABS:                        14.2   8.32  )-----------( 128      ( 27 Jul 2020 05:26 )             44.1     07-27    142  |  104  |  19.0  ----------------------------<  93  4.8   |  23.0  |  1.24    Ca    9.8      27 Jul 2020 05:26  Mg     1.9     07-27    TPro  7.6  /  Alb  4.3  /  TBili  1.2  /  DBili  x   /  AST  28  /  ALT  7   /  AlkPhos  70  07-26    CARDIAC MARKERS ( 27 Jul 2020 05:26 )  x     / <0.01 ng/mL / 48 U/L / x     / x      CARDIAC MARKERS ( 26 Jul 2020 15:31 )  x     / <0.01 ng/mL / x     / x     / x          PT/INR - ( 26 Jul 2020 15:31 )   PT: 11.9 sec;   INR: 1.03 ratio         PTT - ( 26 Jul 2020 15:31 )  PTT:28.6 sec  Serum Pro-Brain Natriuretic Peptide: 2241 pg/mL (07-26 @ 15:31)  serum  Lipids:         RADIOLOGY & ADDITIONAL STUDIES:  < from: US Duplex Carotid Arteries Complete, Bilateral (07.27.20 @ 07:54) >  FINDINGS:    Mild plaque in the carotid bulbs, greater on the left.  Low peak systolic velocities in the right common and internal carotid artery with blunted peak systolic waveforms. Peak systolic velocities in the left internal carotid artery are within normal limits.    An arrhythmia is noted.    The peak systolic velocities in cm/sec are as follows:    RIGHT CAROTID:  PROX CCA = 35 cm/s  MID CCA = 49 cm/s  DIST CCA = 35 cm/s  PROX ICA = 17 cm/s  MID ICA = 30 cm/s  DIST ICA = 31 cm/s  ECA =46 cm/s  ICA/CCA PSV ratio = 1.1    LEFT CAROTID:  PROX CCA = 54 cm/s  MID CCA = 47 cm/s  DIST CCA =  45 cm/s  PROX ICA = 44 cm/s  MID ICA =  45 cm/s  DIST ICA = 61 cm/s  ECA = 31 cm/s  ICA/CCA PSV ratio = 1.4    VERTEBRAL ARTERIES: The vertebral arteries demonstrate antegrade flow bilaterally.    IMPRESSION:    Low peak systolic velocities in the right common and internal carotid arteries with blunted peak systolic waveforms, possibly secondary to a more central or distal stenosis; a CTA or MRA of the head and neck should be considered for further evaluation.    Mild plaque in the carotid bulbs, greater on the left.    No evidence of a hemodynamically significant left internal carotid artery stenosis.    Arrhythmia.      < end of copied text >      ASSESSMENT AND PLAN:  In summary, TD MUNOZ is a 85y Female with HTN, HLD, SVT and Parkinsonism who presents with agitation and anxiety.  Found to be in AF with RVR (newly discovered).    - New onset vs. newly discovered paroxysmal AF.  Elevated CHADSVASc.  Currently on Lovenox 1mg/kg BID.  AC is indicated.  Would transition to DOAC.  - Rate is better controlled, but still elevated at times.  Continue home dose of Toprol XL 25mg BID and increase cardizem to 60mg q8 (convert to long-acting prior to discharge); titrate prn  - Echo 6/2019 EF 65%, mild MR/TR/AI.  Repeat echocardiogram is pending  - Nuclear stress test 6/2019 negative for ischemia.  Nothing to suggest ACS or CHF at this time.  Serial troponins are negative.  - Carotid duplex 7/27/20 without any significant ICA stenosis.  ? more proximal stenosis.  - LDL is 76.  Lipitor 10 and ASA 81 in place.  - Confusion, consider neurology evaluation  - If echocardiogram is within reasonable limits, no further inpatient cardiac work up is planned.    Gavin North MD
[facility], [facility]                                                    [address], [address], [place] [address]                                                         PHONE: [phone]    FAX: [phone] [address], [address], [place] [address]                                                 PHONE: [phone]    FAX: [phone]  *******************************************************************************  cc: AF    HPI:  85F with hx of SVT and parkinsonism who presents with agitation and anxiety. No CP or SOB. No PND or orthopnea. No dizziness or syncope. Hx of HTN and HL. Allergy to ASA. Pt with confusion. Poor historian        Overnight events/Subjective Assessment: no new complaints    INTERPRETATION OF TELEMETRY (personally reviewed): AF CVR    PAST MEDICAL & SURGICAL HISTORY:  HTN  HL  Parkinsons  SVT    Allergy:  ASA        MEDICATIONS  (STANDING):  aspirin  chewable 81 milliGRAM(s) Oral daily  atorvastatin 10 milliGRAM(s) Oral at bedtime  carbidopa/levodopa  25/100 1 Tablet(s) Oral three times a day  diltiazem    Tablet 60 milliGRAM(s) Oral every 8 hours  enoxaparin Injectable 50 milliGRAM(s) SubCutaneous every 12 hours  metoprolol succinate ER 25 milliGRAM(s) Oral two times a day    MEDICATIONS  (PRN):  acetaminophen   Tablet .. 650 milliGRAM(s) Oral every 4 hours PRN Mild Pain (1 - 3)      Vital Signs Last 24 Hrs  T(C): 36.4 (28 Jul 2020 07:28), Max: 36.5 (28 Jul 2020 00:02)  T(F): 97.5 (28 Jul 2020 07:28), Max: 97.7 (28 Jul 2020 00:02)  HR: 77 (28 Jul 2020 07:28) (54 - 86)  BP: 112/72 (28 Jul 2020 07:28) (112/72 - 190/96)  BP(mean): --  RR: 18 (28 Jul 2020 07:28) (18 - 18)  SpO2: 97% (28 Jul 2020 07:28) (97% - 99%)    I&O's Detail    I&O's Summary          PHYSICAL EXAM:  General: Appears well developed, well nourished, no acute distress. not in acute pain  HEAD: normal cephalic. Atraumatic  PUPILS: equal and reactive to light  EARS: normal hearing  NECK: supple. no JVD or HJR. no carotid bruits. no visible lymphadenopathy  NOSE: no gross abnormalities  CHEST: symmetric chest wall expansion  CARDIOVASCULAR: Normal rate. irregular irregular rhythm. Normal S1 and S2, no S3/S4,  no murmur, rub, or gallop  LUNGS: Normal effort. Normal respiratory rate. Breath sounds are clear to auscultation bilaterally. No respiratory distress. No stridor.  no rales, rhonchi or wheeze. no decreased Breath sounds  ABDOMEN: Soft, nontender, non-distended, positive bowel sounds, no mass or bruit. no abdominal tenderness. No rebound. no ascites  EXTREMITIES: No clubbing, cyanosis or edema. normal range of motion  PULSES:  distal pulses WNL  SKIN: Warm and dry with normal turgor. no visible rash or cyanosis   NEURO: Alert & oriented x 3, grossly intact with no focal weakness  PSYCH: normal mood and affect. Grossly normal insight and judgement exhibited    FAMILY HISTORY:  Unable to ascertain    SOCIAL HISTORY:  no active smoking. No ETOH/No IVDA    REVIEW OF SYSTEMS:  Constitutional: no fever, chills or malaise. No weight loss  Head: no trauma  Eyes: no visual deficit. No double vision  Ears: no hearing deficit or ringing in the ears  Nose: no nose bleeds or smell changes or congestion  Throat: no difficult swallowing or painful swallowing  Neck: supple. No lymphadenopathy or swelling  Respiratory: no SOB, wheeze, asthma, COPD. No cough. No blood in the sputum  Cardiovascular: no CP, palpitations, irregular heart beats. No edema. No PND. No orthopnea. No skin/temperature or color changes  Gastrointestinal: no abdominal pain. No constipation. No diarrhea. No melena. No nausea. No vomiting. No bloating  Genitourinary: no frequency or urgency. No hematuria  Lymphatics: no grossly swollen lymph nodes  Musculoskeletal: no limitation of range of motion. Normal strength. No pain  Integumentary: no visible rash. No itching  Neurologic: no HA. No TIA or stroke symptoms. No seizure. No hx of epilepsy. No tingling or numbness. No weakness. No dizziness  Psychiatric: denied. Reports appropriate mood.        LABS:                        14.2   8.32  )-----------( 128      ( 27 Jul 2020 05:26 )             44.1     07-27    142  |  104  |  19.0  ----------------------------<  93  4.8   |  23.0  |  1.24    Ca    9.8      27 Jul 2020 05:26  Mg     1.9     07-27    TPro  7.6  /  Alb  4.3  /  TBili  1.2  /  DBili  x   /  AST  28  /  ALT  7   /  AlkPhos  70  07-26    CARDIAC MARKERS ( 27 Jul 2020 05:26 )  x     / <0.01 ng/mL / 48 U/L / x     / x      CARDIAC MARKERS ( 26 Jul 2020 15:31 )  x     / <0.01 ng/mL / x     / x     / x          PT/INR - ( 26 Jul 2020 15:31 )   PT: 11.9 sec;   INR: 1.03 ratio         PTT - ( 26 Jul 2020 15:31 )  PTT:28.6 sec  Serum Pro-Brain Natriuretic Peptide: 2241 pg/mL (07-26 @ 15:31)  serum  Lipids:     Thyroid Stimulating Hormone, Serum: 1.75 uIU/mL (07-27 @ 05:26)      RADIOLOGY & ADDITIONAL STUDIES:    < from: US Duplex Carotid Arteries Complete, Bilateral (07.27.20 @ 07:54) >  IMPRESSION:    Low peak systolic velocities in the right common and internal carotid arteries with blunted peak systolic waveforms, possibly secondary to a more central or distal stenosis; a CTA or MRA of the head and neck should be considered for further evaluation.    Mild plaque in the carotid bulbs, greater on the left.    No evidence of a hemodynamically significant left internal carotid artery stenosis.    Arrhythmia.    < end of copied text >    < from: CT Head No Cont (07.26.20 @ 12:39) >  IMPRESSION:   Unremarkable head CT.    < end of copied text >    < from: Xray Chest 1 View- PORTABLE-Urgent (07.26.20 @ 12:43) >  IMPRESSION:   No evidence of active chest disease.    < end of copied text >        ASSESSMENT AND PLAN:  In summary, TD MUNOZ is a 85y Female with past medical history significant for  HTN, HLD, SVT and Parkinsonism who presents with agitation and anxiety.  Found to be in AF with RVR (newly discovered).    - New onset vs. newly discovered paroxysmal AF. Hx of SVT in the past.  Elevated CHADSVASc.  Currently on Lovenox 1mg/kg BID.  AC is indicated.  Would transition to DOAC.  - Rate is better controlled, but still elevated at times.  Continue home dose of Toprol XL 25mg BID and increase cardizem to 60mg q8 (convert to long-acting prior to discharge); titrate prn  - Echo 6/2019 EF 65%, mild MR/TR/AI.  Repeat echocardiogram is pending  - Nuclear stress test 6/2019 negative for ischemia.  Nothing to suggest ACS or CHF at this time.  Serial troponins are negative.  - Carotid duplex 7/27/20 without any significant ICA stenosis.  ? more proximal stenosis based on CT carotids done on this admit  - LDL is 76.  Lipitor 10 and ASA 81 in place.  - CE negative for ACS  - Confusion, consider neurology evaluation  - If echocardiogram is within reasonable limits, no further inpatient cardiac work up is planned.    We will follow with you      Ayana Cervantes MD
TD MUNOZ    [id number]    85y      Female    INTERVAL HPI/OVERNIGHT EVENTS: patient being seen for possible tia. patient seen at bedside with Wallisian speaking rn. patient denies any complaints    REVIEW OF SYSTEMS:    CONSTITUTIONAL: No fever, weight loss, or fatigue  RESPIRATORY: No cough, wheezing, hemoptysis; No shortness of breath  CARDIOVASCULAR: No chest pain, palpitations  GASTROINTESTINAL: No abdominal or epigastric pain. No nausea, vomiting  NEUROLOGICAL: No headaches, memory loss, loss of strength.  MISCELLANEOUS:      Vital Signs Last 24 Hrs  T(C): 36.3 (2020 07:24), Max: 37 (2020 12:02)  T(F): 97.4 (2020 07:24), Max: 98.6 (2020 12:02)  HR: 71 (2020 07:24) (71 - 143)  BP: 138/84 (2020 07:24) (124/72 - 236/124)  BP(mean): --  RR: 18 (2020 07:24) (16 - 20)  SpO2: 100% (2020 07:24) (95% - 100%)    PHYSICAL EXAM:    GENERAL: NAD, elderly   HEENT: PERRL, +EOMI  NECK: soft, Supple, No JVD,   CHEST/LUNG: Clear to auscultation bilaterally; No wheezing  HEART: irregular  ABDOMEN: Soft, Nontender, Nondistended; Bowel sounds present  EXTREMITIES:  2+ Peripheral Pulses, No clubbing, cyanosis, or edema  SKIN: No rashes or lesions  NEURO: AAOX3, no focal deficits,     LABS:                        14.2   8.32  )-----------( 128      ( 2020 05:26 )             44.1         142  |  104  |  19.0  ----------------------------<  93  4.8   |  23.0  |  1.24    Ca    9.8      2020 05:26  Mg     1.9     27    TPro  7.6  /  Alb  4.3  /  TBili  1.2  /  DBili  x   /  AST  28  /  ALT  7   /  AlkPhos  70  07-26    PT/INR - ( 2020 15:31 )   PT: 11.9 sec;   INR: 1.03 ratio         PTT - ( 2020 15:31 )  PTT:28.6 sec  Urinalysis Basic - ( 2020 21:55 )    Color: Yellow / Appearance: Clear / S.010 / pH: x  Gluc: x / Ketone: Trace  / Bili: Negative / Urobili: Negative mg/dL   Blood: x / Protein: 30 mg/dL / Nitrite: Negative   Leuk Esterase: Small / RBC: 0-2 /HPF / WBC 0-2   Sq Epi: x / Non Sq Epi: Occasional / Bacteria: x          MEDICATIONS  (STANDING):  aspirin  chewable 81 milliGRAM(s) Oral daily  atorvastatin 10 milliGRAM(s) Oral at bedtime  carbidopa/levodopa  25/100 1 Tablet(s) Oral three times a day  diltiazem    Tablet 60 milliGRAM(s) Oral every 8 hours  enoxaparin Injectable 50 milliGRAM(s) SubCutaneous every 12 hours  metoprolol succinate ER 25 milliGRAM(s) Oral two times a day    MEDICATIONS  (PRN):  acetaminophen   Tablet .. 650 milliGRAM(s) Oral every 4 hours PRN Mild Pain (1 - 3)      RADIOLOGY & ADDITIONAL TESTS:    tte - pending

## 2020-07-28 NOTE — PHYSICAL THERAPY INITIAL EVALUATION ADULT - ADDITIONAL COMMENTS
pt a questionable historian, states was independent in all mobility, owns a RW but does not use it, lives c her son but her son is dealing with heart issues, no stairs to negotiate at home

## 2020-07-28 NOTE — DISCHARGE NOTE PROVIDER - HOSPITAL COURSE
Patient is a 86 y/o woman with HTN, Parkinson's disease who p/w AMS x 4 days. Per daughter-in-law the patient has been very anxious and nervous. Also was found to have elevated HR by EMS at home. Patient was under stress because her daughter wanted to take her to live with her. Patient is a poor historian and reports that her children brought her in due to difficult standing up.      In the ED patient found to a fib with RVR. Cardiology consulted. Patient admitted to medicine and had a negative ct head. patient started on asa and statin and lovenox bid.         patient improved clinically     carotids:    IMPRESSION:        Low peak systolic velocities in the right common and internal carotid arteries with blunted peak systolic waveforms, possibly secondary to a more central or distal stenosis; a CTA or MRA of the head and neck should be considered for further evaluation.        Mild plaque in the carotid bulbs, greater on the left.        No evidence of a hemodynamically significant left internal carotid artery stenosis.        Patient is now stable for dc time spent on dc 34 minutes        PE    GENERAL: NAD, elderly     HEENT: PERRL, +EOMI    NECK: soft, Supple, No JVD,     CHEST/LUNG: Clear to auscultation bilaterally; No wheezing    HEART: irregular    ABDOMEN: Soft, Nontender, Nondistended; Bowel sounds present    EXTREMITIES:  2+ Peripheral Pulses, No clubbing, cyanosis, or edema    SKIN: No rashes or lesions    NEURO: AAOX3, no focal deficits, no difficulties

## 2020-07-28 NOTE — DISCHARGE NOTE NURSING/CASE MANAGEMENT/SOCIAL WORK - PATIENT PORTAL LINK FT
You can access the FollowMyHealth Patient Portal offered by [facility] by registering at the following website: [email or web address]. By joining [employer]’s FollowMyHealth portal, you will also be able to view your health information using other applications (apps) compatible with our system.

## 2020-07-28 NOTE — DISCHARGE NOTE PROVIDER - NSDCCPCAREPLAN_GEN_ALL_CORE_FT
PRINCIPAL DISCHARGE DIAGNOSIS  Diagnosis: Atrial fibrillation, unspecified type  Assessment and Plan of Treatment:       SECONDARY DISCHARGE DIAGNOSES  Diagnosis: TIA (transient ischemic attack)  Assessment and Plan of Treatment:

## 2020-07-28 NOTE — PHYSICAL THERAPY INITIAL EVALUATION ADULT - GAIT PATTERN USED, PT EVAL
swing-to gait/contact assist for safety c turns due to unsteadiness, decreased waylon step length and gait velocity, decreased upright posture and activity tolerance

## 2020-07-28 NOTE — PHYSICAL THERAPY INITIAL EVALUATION ADULT - CRITERIA FOR SKILLED THERAPEUTIC INTERVENTIONS
impairments found/anticipated discharge recommendation/therapy frequency/rehab potential/functional limitations in following categories/predicted duration of therapy intervention

## 2020-07-28 NOTE — PHYSICAL THERAPY INITIAL EVALUATION ADULT - PERTINENT HX OF CURRENT PROBLEM, REHAB EVAL
pt presents to [facility] due to AMS, acute encephalopathy, possible TIA, paroxsymal afib, hx of Parkinson's disease

## 2020-07-28 NOTE — DISCHARGE NOTE PROVIDER - NSDCMRMEDTOKEN_GEN_ALL_CORE_FT
Aspirin Low Dose 81 mg oral tablet, chewable: 1 tab(s) orally once a day  atorvastatin 10 mg oral tablet: 2 tab(s) orally once a day (at bedtime)   carbidopa-levodopa 25 mg-100 mg oral tablet: 1 tab(s) orally 3 times a day  Cardizem  mg/24 hours oral capsule, extended release: 1 cap(s) orally once a day   Eliquis 2.5 mg oral tablet: 1 tab(s) orally 2 times a day   metoprolol succinate 25 mg oral tablet, extended release: 1 tab(s) orally 2 times a day

## 2020-07-28 NOTE — DISCHARGE NOTE NURSING/CASE MANAGEMENT/SOCIAL WORK - NSDCPEPTSTRK_GEN_ALL_CORE
Prescribed medications/Risk factors for stroke/Need for follow up after discharge/Stroke education booklet/Stroke warning signs and symptoms/Signs and symptoms of stroke/Call 911 for stroke/Stroke support groups for patients, families, and friends

## 2023-05-30 NOTE — PHYSICAL THERAPY INITIAL EVALUATION ADULT - PATIENT PROFILE REVIEW, REHAB EVAL
yes Eucrisa Counseling: Patient may experience a mild burning sensation during topical application. Eucrisa is not approved in children less than 2 years of age.